# Patient Record
Sex: FEMALE | Race: ASIAN | URBAN - METROPOLITAN AREA
[De-identification: names, ages, dates, MRNs, and addresses within clinical notes are randomized per-mention and may not be internally consistent; named-entity substitution may affect disease eponyms.]

---

## 2024-02-17 ENCOUNTER — OFFICE VISIT (OUTPATIENT)
Dept: URGENT CARE | Age: 59
End: 2024-02-17
Payer: COMMERCIAL

## 2024-02-17 DIAGNOSIS — A41.9 SEPSIS, DUE TO UNSPECIFIED ORGANISM, UNSPECIFIED WHETHER ACUTE ORGAN DYSFUNCTION PRESENT (HCC): Primary | ICD-10-CM

## 2024-02-17 DIAGNOSIS — R50.9 FEVER, UNSPECIFIED FEVER CAUSE: ICD-10-CM

## 2024-02-17 LAB
S PYO AG THROAT QL: NEGATIVE
SARS-COV-2 AG UPPER RESP QL IA: NEGATIVE
VALID CONTROL: NORMAL

## 2024-02-17 PROCEDURE — 93005 ELECTROCARDIOGRAM TRACING: CPT

## 2024-02-17 PROCEDURE — 82948 REAGENT STRIP/BLOOD GLUCOSE: CPT

## 2024-02-17 PROCEDURE — 87811 SARS-COV-2 COVID19 W/OPTIC: CPT

## 2024-02-17 PROCEDURE — 99213 OFFICE O/P EST LOW 20 MIN: CPT

## 2024-02-17 PROCEDURE — 87880 STREP A ASSAY W/OPTIC: CPT

## 2024-02-17 RX ORDER — IBUPROFEN 400 MG/1
400 TABLET ORAL ONCE
Status: COMPLETED | OUTPATIENT
Start: 2024-02-17 | End: 2024-02-17

## 2024-02-17 RX ORDER — ACETAMINOPHEN 325 MG/1
650 TABLET ORAL ONCE
Status: COMPLETED | OUTPATIENT
Start: 2024-02-17 | End: 2024-02-17

## 2024-02-17 RX ORDER — ONDANSETRON 4 MG/1
4 TABLET, ORALLY DISINTEGRATING ORAL ONCE
Status: COMPLETED | OUTPATIENT
Start: 2024-02-17 | End: 2024-02-17

## 2024-02-17 RX ADMIN — IBUPROFEN 400 MG: 400 TABLET ORAL at 11:18

## 2024-02-17 RX ADMIN — ACETAMINOPHEN 650 MG: 325 TABLET ORAL at 11:18

## 2024-02-17 RX ADMIN — ONDANSETRON 4 MG: 4 TABLET, ORALLY DISINTEGRATING ORAL at 11:18

## 2024-02-17 NOTE — PROGRESS NOTES
[facility]        NAME: Anahi Aponte is a 58 y.o. female  : 1965    MRN: [id number]  DATE: 2024  TIME: 11:33 AM    Assessment and Plan   Sepsis, due to unspecified organism, unspecified whether acute organ dysfunction present (HCC) [A41.9]  1. Sepsis, due to unspecified organism, unspecified whether acute organ dysfunction present (HCC)        2. Fever, unspecified fever cause  Poct Covid 19 Rapid Antigen Test    acetaminophen (TYLENOL) tablet 650 mg    ibuprofen (MOTRIN) tablet 400 mg    ondansetron (ZOFRAN-ODT) dispersible tablet 4 mg    POCT rapid strepA    Transfer to other facility        SP02 on RA 93%, patient placed on 2L NC and SP02 improved to 97%. EKG reveals sinus tachycardia, . One-time dose of Zofran given in clinic for nausea, tylenol and ibuprofen for pain/fever. Rapid COVID and Rapid Strep negative. Suspect viral illness but given sepsis criteria (elevated HR, elevated temperature, and low oxygen level) will transfer to ER for further monitoring, labs, and IV fluids. Patient will transfer via EMS for oxygen support en [facility]. Report given to EMS, EMS to call report to hospital en route.     Patient Instructions     Report to the ER immediately for further evaluation of symptoms.      Chief Complaint     Chief Complaint   Patient presents with    Cold Like Symptoms    Fever     Patient started to have body aches and sore throat on Thursday. She developed fatigue, nasal congestion, and nausea. She has been having high fevers and this morning did not take her metformin. Patient is diaphoretic on appearance and has labored breathing. Patient denies SOB but o2 sat low. She is alert and oriented. Denies chest pain         History of Present Illness       58 year old female presents for evaluation of sore throat, fever (tmax 101F), fatigue, and body aches for the past 2 days. Her children at bedside had  cold-like symptoms last week per her . She denies any other known sick contacts but does have a history of diabetes on Metformin. She is not UTD on COVID or flu vaccines for this season. Her  relates she's tolerating oral intake but has been eating less for the past 2 days and did not take her Metformin today due to not feeling well. She reports two episodes of vomiting and nausea that started this morning. She denies chest pain but reports some shortness of breath. She took tylenol yesterday with some improvement but did not take anything today.     Fever  This is a new problem. The current episode started in the past 7 days. The problem occurs constantly. The problem has been gradually worsening. Associated symptoms include chills, congestion, coughing, diaphoresis, fatigue, a fever, headaches, myalgias, nausea, a sore throat, vomiting and weakness. Pertinent negatives include no abdominal pain, anorexia, arthralgias, change in bowel habit, chest pain, joint swelling, neck pain, numbness, rash, swollen glands, urinary symptoms, vertigo or visual change. The symptoms are aggravated by exertion. She has tried nothing for the symptoms. The treatment provided no relief.       Review of Systems   Review of Systems   Constitutional:  Positive for activity change, chills, diaphoresis, fatigue and fever. Negative for appetite change.   HENT:  Positive for congestion, postnasal drip, rhinorrhea and sore throat. Negative for sinus pressure, sinus pain, sneezing and trouble swallowing.    Eyes:  Negative for visual disturbance.   Respiratory:  Positive for cough. Negative for chest tightness and shortness of breath.    Cardiovascular:  Negative for chest pain and palpitations.   Gastrointestinal:  Positive for nausea and vomiting. Negative for abdominal pain, anorexia, change in bowel habit, constipation and diarrhea.   Musculoskeletal:  Positive for myalgias. Negative for arthralgias, back pain, joint swelling  and neck pain.   Skin:  Negative for color change, pallor and rash.   Allergic/Immunologic: Negative for environmental allergies and food allergies.   Neurological:  Positive for weakness, light-headedness and headaches. Negative for dizziness, vertigo and numbness.         Current Medications     No current outpatient medications on file.  No current facility-administered medications for this visit.    Current Allergies     Allergies as of 02/17/2024    (No Known Allergies)            The following portions of the patient's history were reviewed and updated as appropriate: allergies, current medications, past family history, past medical history, past social history, past surgical history and problem list.     History reviewed. No pertinent past medical history.    History reviewed. No pertinent surgical history.    History reviewed. No pertinent family history.      Medications have been verified.        Objective   /70   Pulse (!) 121   Temp (!) 102 °F (38.9 °C)   Resp 18   SpO2 94%        Physical Exam     Physical Exam  Vitals and nursing note reviewed.   Constitutional:       General: She is awake. She is in acute distress.      Appearance: She is well-developed and normal weight. She is ill-appearing and diaphoretic.   HENT:      Head: Normocephalic and atraumatic.      Right Ear: Hearing, tympanic membrane, ear canal and external ear normal.      Left Ear: Hearing, tympanic membrane, ear canal and external ear normal.      Nose: Congestion and rhinorrhea present. Rhinorrhea is clear.      Right Turbinates: Not enlarged, swollen or pale.      Left Turbinates: Not enlarged, swollen or pale.      Right Sinus: No maxillary sinus tenderness or frontal sinus tenderness.      Left Sinus: No maxillary sinus tenderness or frontal sinus tenderness.      Mouth/Throat:      Lips: Pink.      Mouth: Mucous membranes are dry.      Pharynx: Oropharynx is clear. Uvula midline. No oropharyngeal exudate or posterior  oropharyngeal erythema.   Eyes:      Conjunctiva/sclera: Conjunctivae normal.   Cardiovascular:      Rate and Rhythm: Regular rhythm. Tachycardia present.      Pulses: Normal pulses.      Heart sounds: Normal heart sounds.   Pulmonary:      Effort: Tachypnea present.      Breath sounds: Decreased breath sounds present.      Comments: Patient groaning in pain, difficult to hear lung sounds clearly  Abdominal:      General: Abdomen is flat. Bowel sounds are normal.      Palpations: Abdomen is soft.      Tenderness: There is no abdominal tenderness. There is no guarding.   Musculoskeletal:      Cervical back: Full passive range of motion without pain, normal range of motion and neck supple.   Lymphadenopathy:      Cervical: No cervical adenopathy.   Skin:     General: Skin is warm and moist.   Neurological:      General: No focal deficit present.      Mental Status: She is oriented to person, place, and time. She is lethargic.      GCS: GCS eye subscore is 4. GCS verbal subscore is 5. GCS motor subscore is 6.   Psychiatric:         Mood and Affect: Mood normal.         Speech: Speech is delayed.         Behavior: Behavior normal. Behavior is cooperative.         Thought Content: Thought content normal.         Judgment: Judgment normal.

## 2024-02-18 VITALS
DIASTOLIC BLOOD PRESSURE: 70 MMHG | HEART RATE: 121 BPM | SYSTOLIC BLOOD PRESSURE: 146 MMHG | RESPIRATION RATE: 22 BRPM | TEMPERATURE: 102 F | OXYGEN SATURATION: 94 %

## 2024-02-18 LAB
ATRIAL RATE: 120 BPM
P AXIS: 61 DEGREES
PR INTERVAL: 126 MS
QRS AXIS: 114 DEGREES
QRSD INTERVAL: 98 MS
QT INTERVAL: 320 MS
QTC INTERVAL: 452 MS
T WAVE AXIS: 0 DEGREES
VENTRICULAR RATE: 120 BPM

## 2024-02-19 LAB — GLUCOSE SERPL-MCNC: 233 MG/DL (ref 65–140)
